# Patient Record
Sex: MALE | Race: WHITE | NOT HISPANIC OR LATINO | Employment: OTHER | URBAN - METROPOLITAN AREA
[De-identification: names, ages, dates, MRNs, and addresses within clinical notes are randomized per-mention and may not be internally consistent; named-entity substitution may affect disease eponyms.]

---

## 2017-09-13 PROBLEM — J32.0 CHRONIC MAXILLARY SINUSITIS: Status: ACTIVE | Noted: 2017-09-13

## 2017-09-19 PROBLEM — J32.4 CHRONIC PANSINUSITIS: Status: ACTIVE | Noted: 2017-09-19

## 2017-10-30 PROBLEM — J32.0 MAXILLARY SINUSITIS, CHRONIC: Status: ACTIVE | Noted: 2017-10-30

## 2017-10-30 PROBLEM — J32.2 CHRONIC ETHMOIDAL SINUSITIS: Status: ACTIVE | Noted: 2017-10-30

## 2017-12-22 PROBLEM — M25.561 ACUTE PAIN OF RIGHT KNEE: Status: ACTIVE | Noted: 2017-12-22

## 2017-12-22 PROBLEM — M25.551 PAIN OF RIGHT HIP JOINT: Status: ACTIVE | Noted: 2017-12-22

## 2019-04-17 PROBLEM — K40.90 INGUINAL HERNIA OF RIGHT SIDE WITHOUT OBSTRUCTION OR GANGRENE: Status: ACTIVE | Noted: 2019-04-17

## 2020-10-17 PROBLEM — J32.0 CHRONIC MAXILLARY SINUSITIS: Status: RESOLVED | Noted: 2017-10-30 | Resolved: 2020-10-17

## 2020-10-17 PROBLEM — J32.2 CHRONIC ETHMOIDAL SINUSITIS: Status: RESOLVED | Noted: 2017-10-30 | Resolved: 2020-10-17

## 2020-10-17 PROBLEM — M25.551 PAIN OF RIGHT HIP JOINT: Status: RESOLVED | Noted: 2017-12-22 | Resolved: 2020-10-17

## 2020-10-17 PROBLEM — J32.0 CHRONIC MAXILLARY SINUSITIS: Status: RESOLVED | Noted: 2017-09-13 | Resolved: 2020-10-17

## 2020-10-17 PROBLEM — M25.561 ACUTE PAIN OF RIGHT KNEE: Status: RESOLVED | Noted: 2017-12-22 | Resolved: 2020-10-17

## 2022-07-27 ENCOUNTER — PRE-ADMISSION TESTING (OUTPATIENT)
Dept: ADMISSIONS | Facility: MEDICAL CENTER | Age: 72
End: 2022-07-27
Attending: INTERNAL MEDICINE
Payer: COMMERCIAL

## 2022-07-27 DIAGNOSIS — Z01.810 PRE-OPERATIVE CARDIOVASCULAR EXAMINATION: ICD-10-CM

## 2022-07-27 LAB — EKG IMPRESSION: NORMAL

## 2022-07-27 PROCEDURE — 93005 ELECTROCARDIOGRAM TRACING: CPT

## 2022-07-27 PROCEDURE — 93010 ELECTROCARDIOGRAM REPORT: CPT | Performed by: INTERNAL MEDICINE

## 2022-07-27 ASSESSMENT — FIBROSIS 4 INDEX: FIB4 SCORE: 1.93

## 2022-07-27 NOTE — PREPROCEDURE INSTRUCTIONS
Pre admit appointment completed, questions answered. Pt instructed to continue regularly prescribed meds through day of procedure. Per anesthesia protocol instructed to take these meds the day of procedure-tylenol if needed and omeprazole. METs score >4. States has had follow up sleep study and now negative for NUZHAT.

## 2022-08-01 PROBLEM — H61.23 BILATERAL IMPACTED CERUMEN: Status: ACTIVE | Noted: 2022-08-01

## 2022-08-12 ENCOUNTER — ANESTHESIA (OUTPATIENT)
Dept: SURGERY | Facility: MEDICAL CENTER | Age: 72
End: 2022-08-12
Payer: COMMERCIAL

## 2022-08-12 ENCOUNTER — ANESTHESIA EVENT (OUTPATIENT)
Dept: SURGERY | Facility: MEDICAL CENTER | Age: 72
End: 2022-08-12
Payer: COMMERCIAL

## 2022-08-12 ENCOUNTER — HOSPITAL ENCOUNTER (OUTPATIENT)
Facility: MEDICAL CENTER | Age: 72
End: 2022-08-12
Attending: INTERNAL MEDICINE | Admitting: INTERNAL MEDICINE
Payer: COMMERCIAL

## 2022-08-12 VITALS
HEART RATE: 67 BPM | HEIGHT: 71 IN | RESPIRATION RATE: 24 BRPM | WEIGHT: 155.2 LBS | DIASTOLIC BLOOD PRESSURE: 80 MMHG | BODY MASS INDEX: 21.73 KG/M2 | SYSTOLIC BLOOD PRESSURE: 139 MMHG | OXYGEN SATURATION: 91 % | TEMPERATURE: 97.2 F

## 2022-08-12 PROCEDURE — 700101 HCHG RX REV CODE 250: Performed by: ANESTHESIOLOGY

## 2022-08-12 PROCEDURE — 160035 HCHG PACU - 1ST 60 MINS PHASE I: Performed by: INTERNAL MEDICINE

## 2022-08-12 PROCEDURE — 160009 HCHG ANES TIME/MIN: Performed by: INTERNAL MEDICINE

## 2022-08-12 PROCEDURE — 160203 HCHG ENDO MINUTES - 1ST 30 MINS LEVEL 4: Performed by: INTERNAL MEDICINE

## 2022-08-12 PROCEDURE — 160048 HCHG OR STATISTICAL LEVEL 1-5: Performed by: INTERNAL MEDICINE

## 2022-08-12 PROCEDURE — 160002 HCHG RECOVERY MINUTES (STAT): Performed by: INTERNAL MEDICINE

## 2022-08-12 PROCEDURE — 88112 CYTOPATH CELL ENHANCE TECH: CPT

## 2022-08-12 PROCEDURE — 160046 HCHG PACU - 1ST 60 MINS PHASE II: Performed by: INTERNAL MEDICINE

## 2022-08-12 PROCEDURE — 160025 RECOVERY II MINUTES (STATS): Performed by: INTERNAL MEDICINE

## 2022-08-12 PROCEDURE — 700105 HCHG RX REV CODE 258: Performed by: INTERNAL MEDICINE

## 2022-08-12 PROCEDURE — 160208 HCHG ENDO MINUTES - EA ADDL 1 MIN LEVEL 4: Performed by: INTERNAL MEDICINE

## 2022-08-12 PROCEDURE — 00731 ANES UPR GI NDSC PX NOS: CPT | Performed by: ANESTHESIOLOGY

## 2022-08-12 PROCEDURE — 160036 HCHG PACU - EA ADDL 30 MINS PHASE I: Performed by: INTERNAL MEDICINE

## 2022-08-12 PROCEDURE — 88312 SPECIAL STAINS GROUP 1: CPT

## 2022-08-12 PROCEDURE — 700111 HCHG RX REV CODE 636 W/ 250 OVERRIDE (IP): Performed by: ANESTHESIOLOGY

## 2022-08-12 PROCEDURE — 88305 TISSUE EXAM BY PATHOLOGIST: CPT

## 2022-08-12 PROCEDURE — 99100 ANES PT EXTEME AGE<1 YR&>70: CPT | Performed by: ANESTHESIOLOGY

## 2022-08-12 RX ORDER — KETOROLAC TROMETHAMINE 30 MG/ML
INJECTION, SOLUTION INTRAMUSCULAR; INTRAVENOUS PRN
Status: DISCONTINUED | OUTPATIENT
Start: 2022-08-12 | End: 2022-08-12 | Stop reason: SURG

## 2022-08-12 RX ORDER — MEPERIDINE HYDROCHLORIDE 25 MG/ML
25 INJECTION INTRAMUSCULAR; INTRAVENOUS; SUBCUTANEOUS
Status: DISCONTINUED | OUTPATIENT
Start: 2022-08-12 | End: 2022-08-12 | Stop reason: HOSPADM

## 2022-08-12 RX ORDER — HYDROMORPHONE HYDROCHLORIDE 1 MG/ML
0.1 INJECTION, SOLUTION INTRAMUSCULAR; INTRAVENOUS; SUBCUTANEOUS
Status: DISCONTINUED | OUTPATIENT
Start: 2022-08-12 | End: 2022-08-12 | Stop reason: HOSPADM

## 2022-08-12 RX ORDER — HYDROMORPHONE HYDROCHLORIDE 1 MG/ML
0.5 INJECTION, SOLUTION INTRAMUSCULAR; INTRAVENOUS; SUBCUTANEOUS
Status: DISCONTINUED | OUTPATIENT
Start: 2022-08-12 | End: 2022-08-12 | Stop reason: HOSPADM

## 2022-08-12 RX ORDER — OXYCODONE HCL 5 MG/5 ML
5 SOLUTION, ORAL ORAL
Status: DISCONTINUED | OUTPATIENT
Start: 2022-08-12 | End: 2022-08-12 | Stop reason: HOSPADM

## 2022-08-12 RX ORDER — OXYCODONE HCL 5 MG/5 ML
10 SOLUTION, ORAL ORAL
Status: DISCONTINUED | OUTPATIENT
Start: 2022-08-12 | End: 2022-08-12 | Stop reason: HOSPADM

## 2022-08-12 RX ORDER — DEXAMETHASONE SODIUM PHOSPHATE 4 MG/ML
INJECTION, SOLUTION INTRA-ARTICULAR; INTRALESIONAL; INTRAMUSCULAR; INTRAVENOUS; SOFT TISSUE PRN
Status: DISCONTINUED | OUTPATIENT
Start: 2022-08-12 | End: 2022-08-12 | Stop reason: SURG

## 2022-08-12 RX ORDER — IPRATROPIUM BROMIDE AND ALBUTEROL SULFATE 2.5; .5 MG/3ML; MG/3ML
3 SOLUTION RESPIRATORY (INHALATION)
Status: DISCONTINUED | OUTPATIENT
Start: 2022-08-12 | End: 2022-08-12 | Stop reason: HOSPADM

## 2022-08-12 RX ORDER — ONDANSETRON 2 MG/ML
INJECTION INTRAMUSCULAR; INTRAVENOUS PRN
Status: DISCONTINUED | OUTPATIENT
Start: 2022-08-12 | End: 2022-08-12 | Stop reason: SURG

## 2022-08-12 RX ORDER — DIPHENHYDRAMINE HYDROCHLORIDE 50 MG/ML
12.5 INJECTION INTRAMUSCULAR; INTRAVENOUS
Status: DISCONTINUED | OUTPATIENT
Start: 2022-08-12 | End: 2022-08-12 | Stop reason: HOSPADM

## 2022-08-12 RX ORDER — SODIUM CHLORIDE, SODIUM LACTATE, POTASSIUM CHLORIDE, CALCIUM CHLORIDE 600; 310; 30; 20 MG/100ML; MG/100ML; MG/100ML; MG/100ML
INJECTION, SOLUTION INTRAVENOUS CONTINUOUS
Status: DISCONTINUED | OUTPATIENT
Start: 2022-08-12 | End: 2022-08-12 | Stop reason: HOSPADM

## 2022-08-12 RX ORDER — MIDAZOLAM HYDROCHLORIDE 1 MG/ML
1 INJECTION INTRAMUSCULAR; INTRAVENOUS
Status: DISCONTINUED | OUTPATIENT
Start: 2022-08-12 | End: 2022-08-12 | Stop reason: HOSPADM

## 2022-08-12 RX ORDER — ONDANSETRON 2 MG/ML
4 INJECTION INTRAMUSCULAR; INTRAVENOUS
Status: DISCONTINUED | OUTPATIENT
Start: 2022-08-12 | End: 2022-08-12 | Stop reason: HOSPADM

## 2022-08-12 RX ORDER — HYDROMORPHONE HYDROCHLORIDE 1 MG/ML
0.2 INJECTION, SOLUTION INTRAMUSCULAR; INTRAVENOUS; SUBCUTANEOUS
Status: DISCONTINUED | OUTPATIENT
Start: 2022-08-12 | End: 2022-08-12 | Stop reason: HOSPADM

## 2022-08-12 RX ORDER — SODIUM CHLORIDE, SODIUM LACTATE, POTASSIUM CHLORIDE, CALCIUM CHLORIDE 600; 310; 30; 20 MG/100ML; MG/100ML; MG/100ML; MG/100ML
INJECTION, SOLUTION INTRAVENOUS CONTINUOUS
Status: ACTIVE | OUTPATIENT
Start: 2022-08-12 | End: 2022-08-12

## 2022-08-12 RX ORDER — LIDOCAINE HYDROCHLORIDE 20 MG/ML
INJECTION, SOLUTION EPIDURAL; INFILTRATION; INTRACAUDAL; PERINEURAL PRN
Status: DISCONTINUED | OUTPATIENT
Start: 2022-08-12 | End: 2022-08-12 | Stop reason: SURG

## 2022-08-12 RX ADMIN — SUGAMMADEX 150 MG: 100 INJECTION, SOLUTION INTRAVENOUS at 16:44

## 2022-08-12 RX ADMIN — LIDOCAINE HYDROCHLORIDE 40 MG: 20 INJECTION, SOLUTION EPIDURAL; INFILTRATION; INTRACAUDAL at 16:03

## 2022-08-12 RX ADMIN — ROCURONIUM BROMIDE 50 MG: 10 INJECTION, SOLUTION INTRAVENOUS at 16:03

## 2022-08-12 RX ADMIN — DEXAMETHASONE SODIUM PHOSPHATE 4 MG: 4 INJECTION, SOLUTION INTRA-ARTICULAR; INTRALESIONAL; INTRAMUSCULAR; INTRAVENOUS; SOFT TISSUE at 16:12

## 2022-08-12 RX ADMIN — FENTANYL CITRATE 100 MCG: 50 INJECTION, SOLUTION INTRAMUSCULAR; INTRAVENOUS at 16:03

## 2022-08-12 RX ADMIN — KETOROLAC TROMETHAMINE 30 MG: 30 INJECTION, SOLUTION INTRAMUSCULAR at 16:12

## 2022-08-12 RX ADMIN — ONDANSETRON 4 MG: 2 INJECTION INTRAMUSCULAR; INTRAVENOUS at 16:12

## 2022-08-12 RX ADMIN — SODIUM CHLORIDE, POTASSIUM CHLORIDE, SODIUM LACTATE AND CALCIUM CHLORIDE: 600; 310; 30; 20 INJECTION, SOLUTION INTRAVENOUS at 13:21

## 2022-08-12 RX ADMIN — PROPOFOL 150 MG: 10 INJECTION, EMULSION INTRAVENOUS at 16:03

## 2022-08-12 ASSESSMENT — FIBROSIS 4 INDEX: FIB4 SCORE: 1.93

## 2022-08-12 ASSESSMENT — PAIN SCALES - GENERAL: PAIN_LEVEL: 0

## 2022-08-12 NOTE — OR SURGEON
EGD, EUS, FNA - operative note    PreOp Diagnosis: Enlarging pancreatic cyst with pancreatic duct dilation      PostOp Diagnosis: Same      Procedure(s):  GASTROSCOPY - Wound Class: None  EGD, WITH ENDOSCOPIC US - FOR PANCREAS MASS - Wound Class: None  EGD, WITH ASPIRATION BIOPSY - WITH FINE NEEDLE - Wound Class: None    Surgeon(s):  Jamel Reed M.D.    Anesthesiologist/Type of Anesthesia:  Anesthesiologist: Jorgito Scott M.D./General    Surgical Staff:  Circulator: Max Villalpando R.N.  Endoscopy Technician: Lea Jacobs; Clarice Talavera; Stacey Jesus; Dayanna Cabello    Specimens removed if any:  ID Type Source Tests Collected by Time Destination   A : Antrum, Biopsy Other Other PATHOLOGY SPECIMEN Jamel Reed M.D. 8/12/2022  4:12 PM    B :  Buccal swab INTERPACE Other Other PATHOLOGY SPECIMEN, MISCELLANEOUS TEST Jamel Reed M.D. 8/12/2022  4:26 PM    C : Head of pancrease cyst,  INTERPACE Other Other PATHOLOGY SPECIMEN, MISCELLANEOUS TEST Jamel Reed M.D. 8/12/2022  4:26 PM    D : Head of pancrease cyst, cytology Other Other PATHOLOGY SPECIMEN Jamel Reed M.D. 8/12/2022  4:31 PM        Dr. Reed  [facility]  [place] [facility]  [phone]    EGD with: Biopsy    EUS with: Fine-needle aspiration    Indication: Enlarging pancreatic cyst with pancreatic duct dilation    Sedation: GA (Dr. Scott)    Findings:   EGD    Esophagus     Unremarkable mucosa    Stomach     Proximal mucosa unremarkable     Antrum erythema edema and mosaic mucosal pattern biopsied for H. pylori    Duodenum     Unremarkable mucosa     EUS    Celiac axis     No adenopathy    Pancreas body/tail     Atrophic     Heterogeneous with shadowing and nonshadowing hyperechoic stranding and foci consistent with chronic pancreatitis    Pancreatic duct     Dilated in proximal body of pancreas 12.1 mm     Mildly ectatic and dilated in the tail     Large calcification measuring  15.5 mm x 9.5  mm   Within or immediately adjacent to the pancreatic duct at level of body and tail of pancreas    Gallbladder     Distended without filling defects    Ampulla     Unremarkable    CBD     Normal course and caliber     No filling defects    Head of pancreas     Heterogeneous with findings consistent with chronic pancreatitis    Head of pancreas cyst     Large cystic structure with micro and macrocystic components     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  Risks, benefits, alternatives, including but not limited to risk of bleeding, infection, perforation, adverse reaction to sedating medicine, failure to identify pathology, pancreatitis and death were explained to the patient who accepted all risks.    Patient was prepped in the left lateral position after intubation and sedation was provided by anesthesia.    EGD  Scope tip of the Olympus flexible gastroscope was passed into the proximal esophagus.  Proximal middle and distal thirds of the esophagus were well visualized.  The stomach was entered and the gastric pool was suctioned.  Air was insufflated.  Scope retroflexed to view the body fundus and cardia which was essentially unremarkable.  Scope straightened to view the antrum and incisura where there was diffuse mild erythema edema and a mild mosaic mucosal pattern.  Biopsies taken for H. pylori.  The pylorus was patent duodenal bulb sweep second and third portion were well visualized and were unremarkable.  The biliary ampulla was seen tangentially and was unremarkable.  The gastroscope was withdrawn and we proceeded with endoscopic  ultrasound.    EUS  The flexible radial echoendoscope was passed into the proximal stomach.  Celiac axis was unremarkable with no adenopathy.  The pancreatic body and tail was atrophic and heterogeneous with multiple nonshadowing and shadowing hyperechoic stranding and foci consistent with chronic pancreatitis.  The pancreatic duct itself was dilated in the proximal body of the pancreas to 12.1 mm.  It was mildly ectatic and mildly dilated in the tail but not larger than in the body.  Adjacent or within the pancreatic duct at the level of the body was a 9.5 mm x 15.5 mm shadowing calcification.  This was not causing upstream dilation or obstruction.  The scope was advanced to the antrum and the gallbladder was seen to be distended without filling defects.  Normal wall thickness.  The scope was advanced into the duodenum.  Biliary ampulla was endosonographically unremarkable.  The common bile duct had normal course and caliber and no filling defects were appreciated.  The head of the pancreas itself also had findings consistent with chronic pancreatitis with hyperechoic stranding and foci which were both shadowing and nonshadowing.  The parenchyma was heterogeneous.  There was a large cystic structure deep in the head of the pancreas with apparent communication with this sidebranch of the pancreatic duct consistent with an intraductal papillary mucinous neoplasm.  No solid components were appreciated.  The lesion itself had macrocystic and microcystic components.  It measured 19.1 mm x 25.5 mm.  The radial echoendoscope was withdrawn and the flexible linear echoendoscope was passed to the level of the head of the pancreas.  A 22-gauge [place] [employer] acquire needle was utilized for fine-needle aspiration.  Very little fluid was obtained given the thick and viscous nature of the fluid.  A second pass was made with a 19-gauge [place] [employer] expect needle.  Once this fluid was collected the procedure was deemed  complete the needle was withdrawn.  Air and liquid were suctioned.  Patient tolerated the procedure well and was sent to recovery without immediate complications.      8/12/2022 4:48 PM Jamel Reed M.D.

## 2022-08-12 NOTE — ANESTHESIA TIME REPORT
Anesthesia Start and Stop Event Times     Date Time Event    8/12/2022 1520 Ready for Procedure     1558 Anesthesia Start     1658 Anesthesia Stop        Responsible Staff  08/12/22    Name Role Begin End    Jorgito Scott M.D. Anesth 1559 1651        Overtime Reason:  no overtime (within assigned shift)    Comments:

## 2022-08-12 NOTE — OR NURSING
1650 Patient to PACU from ENDO after report received      1707 Dr Reed at bedside to give patient and update.    1715 Patient awake alert and oriented. Tolerating clears.     1730 no changes     1740 patient had a need to express his feelings about this process, what it could mean and his thoughts about his life. Gave patient time to express, share. Gave support and comfort.     1752 called patients wife and gave update and progress    1753 patient meets criteria to move to phase 2 for discharge     1754 moved to stage 2 for discharge to home in stable condition continued care     Patient dressed     1804 Patient education completed, family denies further questions.    1815 DC'd to care of family post uneventful stay in PACU 2.

## 2022-08-12 NOTE — ANESTHESIA PROCEDURE NOTES
Airway    Date/Time: 8/12/2022 4:04 PM  Performed by: Jorgito Scott M.D.  Authorized by: Jorgito Scott M.D.     Location:  OR  Urgency:  Elective  Indications for Airway Management:  Anesthesia      Spontaneous Ventilation: absent    Sedation Level:  Deep  Preoxygenated: Yes    Patient Position:  Sniffing  Final Airway Type:  Endotracheal airway  Final Endotracheal Airway:  ETT  Cuffed: Yes    Technique Used for Successful ETT Placement:  Direct laryngoscopy    Insertion Site:  Oral  Blade Type:  Jules  Laryngoscope Blade/Videolaryngoscope Blade Size:  3  ETT Size (mm):  7.5  Measured from:  Teeth  ETT to Teeth (cm):  24  Placement Verified by: auscultation and capnometry    Cormack-Lehane Classification:  Grade I - full view of glottis  Number of Attempts at Approach:  1   Pt only has teeth #9-11.  Other teeth are missing.

## 2022-08-13 LAB — PATHOLOGY CONSULT NOTE: NORMAL

## 2022-08-13 NOTE — ANESTHESIA POSTPROCEDURE EVALUATION
Patient: Natan Lombardo    Procedure Summary     Date: 08/12/22 Room / Location:  ENDOSCOPIC ULTRASOUND ROOM / SURGERY [facility]    Anesthesia Start: 1558 Anesthesia Stop: 1658    Procedures:       GASTROSCOPY (Abdomen)      EGD, WITH ENDOSCOPIC US - FOR PANCREAS MASS (Abdomen)      EGD, WITH ASPIRATION BIOPSY - WITH FINE NEEDLE (Abdomen) Diagnosis: (PANCREAS CYST)    Surgeons: Jamel Reed M.D. Responsible Provider: Jorgito Scott M.D.    Anesthesia Type: general ASA Status: 3          Final Anesthesia Type: general  Last vitals  BP   Blood Pressure : (!) 146/71    Temp   36.3 °C (97.3 °F)    Pulse   (!) 20   Resp   (!) 57    SpO2   99 %      Anesthesia Post Evaluation    Patient location during evaluation: PACU  Patient participation: complete - patient participated  Level of consciousness: awake and alert  Pain score: 0    Airway patency: patent  Anesthetic complications: no  Cardiovascular status: hemodynamically stable  Respiratory status: acceptable  Hydration status: euvolemic    PONV: none          No notable events documented.     Nurse Pain Score: 0 (NPRS)

## 2022-08-13 NOTE — DISCHARGE INSTRUCTIONS
If any questions arise, call your provider.  If your provider is not available, please feel free to call the Surgical Center at [phone].    MEDICATIONS: Resume taking daily medication.  Take prescribed pain medication with food.  If no medication is prescribed, you may take non-aspirin pain medication if needed.  PAIN MEDICATION CAN BE VERY CONSTIPATING.  Take a stool softener or laxative such as senokot, pericolace, or milk of magnesia if needed.    Last pain medication given at     What to Expect Post Anesthesia    Rest and take it easy for the first 24 hours.  A responsible adult is recommended to remain with you during that time.  It is normal to feel sleepy.  We encourage you to not do anything that requires balance, judgment or coordination.    FOR 24 HOURS DO NOT:  Drive, operate machinery or run household appliances.  Drink beer or alcoholic beverages.  Make important decisions or sign legal documents.    To avoid nausea, slowly advance diet as tolerated, avoiding spicy or greasy foods for the first day.  Add more substantial food to your diet according to your provider's instructions.  Babies can be fed formula or breast milk as soon as they are hungry.  INCREASE FLUIDS AND FIBER TO AVOID CONSTIPATION.    MILD FLU-LIKE SYMPTOMS ARE NORMAL.  YOU MAY EXPERIENCE GENERALIZED MUSCLE ACHES, THROAT IRRITATION, HEADACHE AND/OR SOME NAUSEA.    [facility] [place]        ENDOSCOPY HOME CARE INSTRUCTIONS    GASTROSCOPY OR ERCP  1. Don't eat or drink anything for about an hour after the test. You can then resume your regular diet.  2. Don't drive or drink alcohol for 24 hours. The medication you received will make you too drowsy.  3. Don't take any coffee, tea, or aspirin products until after you see your doctor. These can harm the lining of your stomach.  4. If you begin to vomit bloody material, or develop black or bloody stools, call your doctor as soon as possible.  5. If you have any neck, chest,  abdominal pain or temp of 100 degrees, call your doctor.  6. See your doctor   7. Additional instructions:   8. Prescriptions:     If any questions arise, call your doctor.  If your doctor is not available, please feel free to call the [facility] [phone].  The Center is open Monday through Friday from 7AM to 5PM, Or call the Emergency Department [phone].          I acknowledge receipt and understanding of these Home Care Instructions.     Date:__________________ Time:______________________     Signature of Patient / Responsible Adult   ______________________________     Discharging Nurse___________________________________      Dr. Reed  [facility]  [place] [facility]  [phone]     EGD with:      Biopsy     EUS with:       Fine-needle aspiration     Indication:      Enlarging pancreatic cyst with pancreatic duct dilation     Sedation:       GA (Dr. Scott)     Findings:             EGD                         Esophagus                                     Unremarkable mucosa                         Stomach                                     Proximal mucosa unremarkable                                     Antrum erythema edema and mosaic mucosal pattern biopsied for H. pylori                         Duodenum                                     Unremarkable mucosa                EUS                         Celiac axis                                     No adenopathy                         Pancreas body/tail                                     Atrophic                                     Heterogeneous with shadowing and nonshadowing hyperechoic stranding and foci consistent with chronic pancreatitis                         Pancreatic duct                                     Dilated in proximal body of pancreas 12.1 mm                                     Mildly ectatic and dilated in the tail                                     Large calcification measuring  15.5 mm x 9.5 mm              Within or immediately adjacent to the pancreatic duct at level of body and tail of pancreas                         Gallbladder                                     Distended without filling defects                         Ampulla                                     Unremarkable                         CBD                                     Normal course and caliber                                     No filling defects                         Head of pancreas                                     Heterogeneous with findings consistent with chronic pancreatitis                         Head of pancreas cyst                                     Large cystic structure with micro and macrocystic components                                     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

## 2023-03-08 PROBLEM — H61.23 BILATERAL IMPACTED CERUMEN: Status: RESOLVED | Noted: 2022-08-01 | Resolved: 2023-03-08

## 2023-03-08 PROBLEM — K40.90 INGUINAL HERNIA OF RIGHT SIDE WITHOUT OBSTRUCTION OR GANGRENE: Status: RESOLVED | Noted: 2019-04-17 | Resolved: 2023-03-08

## (undated) DEVICE — TUBE E-T HI-LO CUFF 8.0MM (10EA/PK)

## (undated) DEVICE — TUBE E-T HI-LO CUFF 7.5MM (10EA/PK)

## (undated) DEVICE — BITE BLOCK ADULT 60FR (100EA/CA)

## (undated) DEVICE — SYRINGE SAFETY 5 ML 18 GA X 1-1/2 BLUNT LL (100/BX 4BX/CA)

## (undated) DEVICE — KIT  I.V. START (100EA/CA)

## (undated) DEVICE — CANISTER SUCTION RIGID RED 1500CC (40EA/CA)

## (undated) DEVICE — FORCEP RADIAL JAW 4 STANDARD CAPACITY W/NEEDLE 240CM (40EA/BX)

## (undated) DEVICE — CATHETER IV SAFETY 22 GA X 1 (50EA/BX)

## (undated) DEVICE — TUBE CONNECTING SUCTION - CLEAR PLASTIC STERILE 72 IN (50EA/CA)

## (undated) DEVICE — GOWN SURGEONS LARGE - (32/CA)

## (undated) DEVICE — TUBE E-T HI-LO CUFF 6.5MM (10EA/BX)

## (undated) DEVICE — TUBE E-T HI-LO CUFF 8.5MM (10EA/PK)

## (undated) DEVICE — WATER IRRIGATION STERILE 1000ML (12EA/CA)

## (undated) DEVICE — MASK WITH FACE SHIELD (25/BX 4BX/CA)

## (undated) DEVICE — SYRINGE SAFETY 10 ML 18 GA X 1 1/2 BLUNT LL (100/BX 4BX/CA)

## (undated) DEVICE — CATHETER IV SAFETY 20 GA X 1-1/4 (50/BX)

## (undated) DEVICE — TOWEL STOP TIMEOUT SAFETY FLAG (40EA/CA)

## (undated) DEVICE — NEEDLE 19GA FLEX EUS (5/BX)

## (undated) DEVICE — LACTATED RINGERS INJ 1000 ML - (14EA/CA 60CA/PF)

## (undated) DEVICE — SENSOR OXIMETER ADULT SPO2 RD SET (20EA/BX)

## (undated) DEVICE — TUBE E-T HI-LO CUFF 7.0MM (10EA/PK)

## (undated) DEVICE — TUBE SUCTION YANKAUER  1/4 X 6FT (20EA/CA)"

## (undated) DEVICE — SYRINGE SAFETY 3 ML 18 GA X 1 1/2 BLUNT LL (100/BX 8BX/CA)

## (undated) DEVICE — KIT CUSTOM PROCEDURE SINGLE FOR ENDO  (15/CA)

## (undated) DEVICE — ACQUIRE 22G FNB NEEDLE

## (undated) DEVICE — SYRINGE DISP. 60 CC LL - (30/BX, 12BX/CA)**WHEN THESE ARE GONE ORDER #500206**

## (undated) DEVICE — GLOVE, LITE (PAIR)